# Patient Record
Sex: MALE | Race: WHITE | NOT HISPANIC OR LATINO | Employment: OTHER | URBAN - METROPOLITAN AREA
[De-identification: names, ages, dates, MRNs, and addresses within clinical notes are randomized per-mention and may not be internally consistent; named-entity substitution may affect disease eponyms.]

---

## 2023-09-26 DIAGNOSIS — M25.512 LEFT SHOULDER PAIN, UNSPECIFIED CHRONICITY: Primary | ICD-10-CM

## 2023-09-27 ENCOUNTER — OFFICE VISIT (OUTPATIENT)
Dept: ORTHOPEDICS | Facility: CLINIC | Age: 68
End: 2023-09-27
Payer: MEDICARE

## 2023-09-27 ENCOUNTER — HOSPITAL ENCOUNTER (OUTPATIENT)
Dept: RADIOLOGY | Facility: HOSPITAL | Age: 68
Discharge: HOME | End: 2023-09-27
Attending: ORTHOPAEDIC SURGERY
Payer: MEDICARE

## 2023-09-27 VITALS — BODY MASS INDEX: 26.36 KG/M2 | WEIGHT: 178 LBS | HEIGHT: 69 IN

## 2023-09-27 DIAGNOSIS — M25.512 LEFT SHOULDER PAIN, UNSPECIFIED CHRONICITY: ICD-10-CM

## 2023-09-27 DIAGNOSIS — M25.512 LEFT SHOULDER PAIN: Primary | ICD-10-CM

## 2023-09-27 PROCEDURE — 73030 X-RAY EXAM OF SHOULDER: CPT | Mod: TC,PO,LT

## 2023-09-27 PROCEDURE — 99212 OFFICE O/P EST SF 10 MIN: CPT | Mod: PBBFAC,PO | Performed by: ORTHOPAEDIC SURGERY

## 2023-09-27 PROCEDURE — 73030 X-RAY EXAM OF SHOULDER: CPT | Mod: 26,LT,, | Performed by: RADIOLOGY

## 2023-09-27 PROCEDURE — 99204 PR OFFICE/OUTPT VISIT, NEW, LEVL IV, 45-59 MIN: ICD-10-PCS | Mod: S$PBB,,, | Performed by: ORTHOPAEDIC SURGERY

## 2023-09-27 PROCEDURE — 99204 OFFICE O/P NEW MOD 45 MIN: CPT | Mod: S$PBB,,, | Performed by: ORTHOPAEDIC SURGERY

## 2023-09-27 PROCEDURE — 99999 PR PBB SHADOW E&M-EST. PATIENT-LVL II: CPT | Mod: PBBFAC,,, | Performed by: ORTHOPAEDIC SURGERY

## 2023-09-27 PROCEDURE — 73030 XR SHOULDER TRAUMA 3 VIEW LEFT: ICD-10-PCS | Mod: 26,LT,, | Performed by: RADIOLOGY

## 2023-09-27 PROCEDURE — 99999 PR PBB SHADOW E&M-EST. PATIENT-LVL II: ICD-10-PCS | Mod: PBBFAC,,, | Performed by: ORTHOPAEDIC SURGERY

## 2023-09-27 NOTE — PROGRESS NOTES
67 years old left shoulder pain for the past several months.  Has a history of the rotator cuff repair done in 2008 [place] he is moved down from [place] been here for the past several months wants to establish care.  Pain with certain movements such as abduction or reaching behind his back.  Pain is 5 on good days 7 on bad days.  Oral anti-inflammatories and injections have provided partial relief.    Exam shows full motion of the shoulder weakly positive Neer Benitez impingement sign, cuff strength weak and painful     X-rays are negative, MRI from outlying facility shows chronic retracted full-thickness rotator cuff tear     Assessment: Chronic left shoulder rotator cuff tear     Plan:  Patient was told he would need a reverse shoulder arthroplasty, at this point he is quite functional I do not feel that would be the best option for him.  We will get him set up with a home exercise program, we did offer him therapy, and we did discuss with him the possibility of subacromial balloon spacer

## 2023-12-18 ENCOUNTER — OFFICE VISIT (OUTPATIENT)
Dept: ORTHOPEDICS | Facility: CLINIC | Age: 68
End: 2023-12-18
Payer: MEDICARE

## 2023-12-18 VITALS — BODY MASS INDEX: 26.36 KG/M2 | WEIGHT: 178 LBS | HEIGHT: 69 IN

## 2023-12-18 DIAGNOSIS — M25.512 ACUTE PAIN OF LEFT SHOULDER: Primary | ICD-10-CM

## 2023-12-18 PROCEDURE — 99999 PR PBB SHADOW E&M-EST. PATIENT-LVL II: ICD-10-PCS | Mod: PBBFAC,,, | Performed by: ORTHOPAEDIC SURGERY

## 2023-12-18 PROCEDURE — 99215 PR OFFICE/OUTPT VISIT, EST, LEVL V, 40-54 MIN: ICD-10-PCS | Mod: 57,S$PBB,, | Performed by: ORTHOPAEDIC SURGERY

## 2023-12-18 PROCEDURE — 99212 OFFICE O/P EST SF 10 MIN: CPT | Mod: PBBFAC,PO | Performed by: ORTHOPAEDIC SURGERY

## 2023-12-18 PROCEDURE — 99999 PR PBB SHADOW E&M-EST. PATIENT-LVL II: CPT | Mod: PBBFAC,,, | Performed by: ORTHOPAEDIC SURGERY

## 2023-12-18 PROCEDURE — 99215 OFFICE O/P EST HI 40 MIN: CPT | Mod: 57,S$PBB,, | Performed by: ORTHOPAEDIC SURGERY

## 2023-12-18 NOTE — PROGRESS NOTES
68 years old persistent pain in the left shoulder. He is attempted home exercise program without complete relief still having pain in his shoulder with overhead activity reaching behind his back     History significant for previous rotator cuff surgery    Exam shows full motion of the shoulder positive Neer Benitez impingement sign, cuff strength weak and painful     X-ray and MRI from outlying facility show chronic full-thickness retracted tear of the rotator cuff     Assessment:  Chronic left shoulder rotator cuff tear,  recurrent rotator cuff tear     Plan: We will schedule the patient for shoulder arthroscopy attempt rotator cuff repair and if unable to repair we will place a subacromial balloon spacer

## 2023-12-21 DIAGNOSIS — M25.512 LEFT SHOULDER PAIN: Primary | ICD-10-CM

## 2023-12-21 DIAGNOSIS — M75.102 ROTATOR CUFF TEAR, LEFT: ICD-10-CM

## 2023-12-21 RX ORDER — CEFAZOLIN SODIUM 2 G/50ML
2 SOLUTION INTRAVENOUS
Status: CANCELLED | OUTPATIENT
Start: 2023-12-21

## 2023-12-21 RX ORDER — MUPIROCIN 20 MG/G
OINTMENT TOPICAL
Status: CANCELLED | OUTPATIENT
Start: 2023-12-21

## 2024-01-18 ENCOUNTER — TELEPHONE (OUTPATIENT)
Dept: ORTHOPEDICS | Facility: CLINIC | Age: 69
End: 2024-01-18
Payer: MEDICARE

## 2024-01-18 NOTE — TELEPHONE ENCOUNTER
Contacted Kezia. Pre-op instruction [facility] to the [facility] and was reviewed with Kezia. Paperwork was missed placed. Patient verbalized understanding.

## 2024-01-18 NOTE — TELEPHONE ENCOUNTER
----- Message from Vincenzo Matamoros MA sent at 1/18/2024  1:39 PM CST -----  Contact: wife/Kezia  Has questions regarding Pre Op appts?    Call back number is [phone]

## 2024-01-22 ENCOUNTER — TELEPHONE (OUTPATIENT)
Dept: ORTHOPEDICS | Facility: CLINIC | Age: 69
End: 2024-01-22
Payer: MEDICARE

## 2024-01-22 NOTE — TELEPHONE ENCOUNTER
----- Message from Vincenzo Matamoros MA sent at 1/22/2024  1:36 PM CST -----  Contact: wife/Kezia  Wants to know if patient needs any surgical clearances done prior to his surgery on 1/30/24?    Call back number is [phone]

## 2024-01-22 NOTE — TELEPHONE ENCOUNTER
Spoke with patient and patient's wife. Explained no pre-operative clearance or labwork necessary for this type of procedure.

## 2024-01-25 RX ORDER — VITAMIN B COMPLEX
1 CAPSULE ORAL DAILY
COMMUNITY

## 2024-01-29 ENCOUNTER — ANESTHESIA EVENT (OUTPATIENT)
Dept: SURGERY | Facility: HOSPITAL | Age: 69
End: 2024-01-29
Payer: MEDICARE

## 2024-01-29 DIAGNOSIS — Z98.890 S/P ARTHROSCOPY OF LEFT SHOULDER: Primary | ICD-10-CM

## 2024-01-29 RX ORDER — OXYCODONE AND ACETAMINOPHEN 7.5; 325 MG/1; MG/1
1 TABLET ORAL
Qty: 32 TABLET | Refills: 0 | Status: SHIPPED | OUTPATIENT
Start: 2024-01-29 | End: 2024-05-31

## 2024-01-30 ENCOUNTER — ANESTHESIA (OUTPATIENT)
Dept: SURGERY | Facility: HOSPITAL | Age: 69
End: 2024-01-30
Payer: MEDICARE

## 2024-01-30 ENCOUNTER — HOSPITAL ENCOUNTER (OUTPATIENT)
Facility: HOSPITAL | Age: 69
Discharge: HOME | End: 2024-01-30
Attending: ORTHOPAEDIC SURGERY | Admitting: ORTHOPAEDIC SURGERY
Payer: MEDICARE

## 2024-01-30 DIAGNOSIS — M25.512 LEFT SHOULDER PAIN: ICD-10-CM

## 2024-01-30 DIAGNOSIS — M75.102 ROTATOR CUFF TEAR, LEFT: ICD-10-CM

## 2024-01-30 PROCEDURE — 36000710: Mod: PO | Performed by: ORTHOPAEDIC SURGERY

## 2024-01-30 PROCEDURE — 37000008 HC ANESTHESIA 1ST 15 MINUTES: Mod: PO | Performed by: ORTHOPAEDIC SURGERY

## 2024-01-30 PROCEDURE — 63600175 PHARM REV CODE 636 W HCPCS: Mod: PO | Performed by: NURSE ANESTHETIST, CERTIFIED REGISTERED

## 2024-01-30 PROCEDURE — 71000033 HC RECOVERY, INTIAL HOUR: Mod: PO | Performed by: ORTHOPAEDIC SURGERY

## 2024-01-30 PROCEDURE — 25000003 PHARM REV CODE 250: Mod: PO | Performed by: ORTHOPAEDIC SURGERY

## 2024-01-30 PROCEDURE — 63600175 PHARM REV CODE 636 W HCPCS: Mod: PO | Performed by: ANESTHESIOLOGY

## 2024-01-30 PROCEDURE — 37000009 HC ANESTHESIA EA ADD 15 MINS: Mod: PO | Performed by: ORTHOPAEDIC SURGERY

## 2024-01-30 PROCEDURE — D9220A PRA ANESTHESIA: Mod: CRNA,,, | Performed by: NURSE ANESTHETIST, CERTIFIED REGISTERED

## 2024-01-30 PROCEDURE — 27201423 OPTIME MED/SURG SUP & DEVICES STERILE SUPPLY: Mod: PO | Performed by: ORTHOPAEDIC SURGERY

## 2024-01-30 PROCEDURE — 71000015 HC POSTOP RECOV 1ST HR: Mod: PO | Performed by: ORTHOPAEDIC SURGERY

## 2024-01-30 PROCEDURE — 36000711: Mod: PO | Performed by: ORTHOPAEDIC SURGERY

## 2024-01-30 PROCEDURE — C1727 CATH, BAL TIS DIS, NON-VAS: HCPCS | Mod: PO | Performed by: ORTHOPAEDIC SURGERY

## 2024-01-30 PROCEDURE — 25000003 PHARM REV CODE 250: Mod: PO | Performed by: NURSE ANESTHETIST, CERTIFIED REGISTERED

## 2024-01-30 PROCEDURE — 29999 UNLISTED PX ARTHROSCOPY: CPT | Mod: ,,, | Performed by: ORTHOPAEDIC SURGERY

## 2024-01-30 PROCEDURE — D9220A PRA ANESTHESIA: Mod: ANES,,, | Performed by: ANESTHESIOLOGY

## 2024-01-30 DEVICE — IMPLANTABLE DEVICE: Type: IMPLANTABLE DEVICE | Site: SHOULDER | Status: FUNCTIONAL

## 2024-01-30 RX ORDER — ROCURONIUM BROMIDE 10 MG/ML
INJECTION, SOLUTION INTRAVENOUS
Status: DISCONTINUED | OUTPATIENT
Start: 2024-01-30 | End: 2024-01-30

## 2024-01-30 RX ORDER — ONDANSETRON HYDROCHLORIDE 2 MG/ML
INJECTION, SOLUTION INTRAVENOUS
Status: DISCONTINUED | OUTPATIENT
Start: 2024-01-30 | End: 2024-01-30

## 2024-01-30 RX ORDER — ACETAMINOPHEN 10 MG/ML
INJECTION, SOLUTION INTRAVENOUS
Status: DISCONTINUED | OUTPATIENT
Start: 2024-01-30 | End: 2024-01-30

## 2024-01-30 RX ORDER — LIDOCAINE HYDROCHLORIDE 10 MG/ML
1 INJECTION, SOLUTION EPIDURAL; INFILTRATION; INTRACAUDAL; PERINEURAL ONCE
Status: DISCONTINUED | OUTPATIENT
Start: 2024-01-30 | End: 2024-01-30 | Stop reason: HOSPADM

## 2024-01-30 RX ORDER — MIDAZOLAM HYDROCHLORIDE 1 MG/ML
0.5 INJECTION INTRAMUSCULAR; INTRAVENOUS
Status: DISCONTINUED | OUTPATIENT
Start: 2024-01-30 | End: 2024-01-30 | Stop reason: HOSPADM

## 2024-01-30 RX ORDER — FENTANYL CITRATE 50 UG/ML
25 INJECTION, SOLUTION INTRAMUSCULAR; INTRAVENOUS EVERY 5 MIN PRN
Status: DISCONTINUED | OUTPATIENT
Start: 2024-01-30 | End: 2024-01-30 | Stop reason: HOSPADM

## 2024-01-30 RX ORDER — CEFAZOLIN SODIUM 1 G/3ML
INJECTION, POWDER, FOR SOLUTION INTRAMUSCULAR; INTRAVENOUS
Status: DISCONTINUED | OUTPATIENT
Start: 2024-01-30 | End: 2024-01-30

## 2024-01-30 RX ORDER — CEFAZOLIN SODIUM 2 G/50ML
2 SOLUTION INTRAVENOUS
Status: DISCONTINUED | OUTPATIENT
Start: 2024-01-30 | End: 2024-01-30 | Stop reason: HOSPADM

## 2024-01-30 RX ORDER — MIDAZOLAM HYDROCHLORIDE 1 MG/ML
INJECTION, SOLUTION INTRAMUSCULAR; INTRAVENOUS
Status: DISCONTINUED | OUTPATIENT
Start: 2024-01-30 | End: 2024-01-30

## 2024-01-30 RX ORDER — EPHEDRINE SULFATE 50 MG/ML
INJECTION, SOLUTION INTRAVENOUS
Status: DISCONTINUED | OUTPATIENT
Start: 2024-01-30 | End: 2024-01-30

## 2024-01-30 RX ORDER — LIDOCAINE HYDROCHLORIDE 20 MG/ML
INJECTION INTRAVENOUS
Status: DISCONTINUED | OUTPATIENT
Start: 2024-01-30 | End: 2024-01-30

## 2024-01-30 RX ORDER — SODIUM CHLORIDE, SODIUM LACTATE, POTASSIUM CHLORIDE, CALCIUM CHLORIDE 600; 310; 30; 20 MG/100ML; MG/100ML; MG/100ML; MG/100ML
INJECTION, SOLUTION INTRAVENOUS CONTINUOUS
Status: DISCONTINUED | OUTPATIENT
Start: 2024-01-30 | End: 2024-01-30 | Stop reason: HOSPADM

## 2024-01-30 RX ORDER — DEXAMETHASONE SODIUM PHOSPHATE 4 MG/ML
8 INJECTION, SOLUTION INTRA-ARTICULAR; INTRALESIONAL; INTRAMUSCULAR; INTRAVENOUS; SOFT TISSUE
Status: COMPLETED | OUTPATIENT
Start: 2024-01-30 | End: 2024-01-30

## 2024-01-30 RX ORDER — HYDROMORPHONE HYDROCHLORIDE 2 MG/ML
0.2 INJECTION, SOLUTION INTRAMUSCULAR; INTRAVENOUS; SUBCUTANEOUS EVERY 5 MIN PRN
Status: DISCONTINUED | OUTPATIENT
Start: 2024-01-30 | End: 2024-01-30 | Stop reason: HOSPADM

## 2024-01-30 RX ORDER — PHENYLEPHRINE HYDROCHLORIDE 10 MG/ML
INJECTION INTRAVENOUS
Status: DISCONTINUED | OUTPATIENT
Start: 2024-01-30 | End: 2024-01-30

## 2024-01-30 RX ORDER — MUPIROCIN 20 MG/G
OINTMENT TOPICAL
Status: DISCONTINUED | OUTPATIENT
Start: 2024-01-30 | End: 2024-01-30 | Stop reason: HOSPADM

## 2024-01-30 RX ORDER — PROPOFOL 10 MG/ML
VIAL (ML) INTRAVENOUS
Status: DISCONTINUED | OUTPATIENT
Start: 2024-01-30 | End: 2024-01-30

## 2024-01-30 RX ORDER — OXYCODONE HYDROCHLORIDE 5 MG/1
5 TABLET ORAL
Status: DISCONTINUED | OUTPATIENT
Start: 2024-01-30 | End: 2024-01-30 | Stop reason: HOSPADM

## 2024-01-30 RX ORDER — FENTANYL CITRATE 50 UG/ML
INJECTION, SOLUTION INTRAMUSCULAR; INTRAVENOUS
Status: DISCONTINUED | OUTPATIENT
Start: 2024-01-30 | End: 2024-01-30

## 2024-01-30 RX ADMIN — EPHEDRINE SULFATE 10 MG: 50 INJECTION INTRAVENOUS at 01:01

## 2024-01-30 RX ADMIN — MUPIROCIN: 20 OINTMENT TOPICAL at 11:01

## 2024-01-30 RX ADMIN — SUGAMMADEX 200 MG: 100 INJECTION, SOLUTION INTRAVENOUS at 01:01

## 2024-01-30 RX ADMIN — SODIUM CHLORIDE, POTASSIUM CHLORIDE, SODIUM LACTATE AND CALCIUM CHLORIDE: 600; 310; 30; 20 INJECTION, SOLUTION INTRAVENOUS at 11:01

## 2024-01-30 RX ADMIN — PROPOFOL 200 MG: 10 INJECTION, EMULSION INTRAVENOUS at 01:01

## 2024-01-30 RX ADMIN — FENTANYL CITRATE 50 MCG: 0.05 INJECTION, SOLUTION INTRAMUSCULAR; INTRAVENOUS at 01:01

## 2024-01-30 RX ADMIN — CEFAZOLIN 2 G: 330 INJECTION, POWDER, FOR SOLUTION INTRAMUSCULAR; INTRAVENOUS at 01:01

## 2024-01-30 RX ADMIN — FENTANYL CITRATE 50 MCG: 50 INJECTION INTRAMUSCULAR; INTRAVENOUS at 11:01

## 2024-01-30 RX ADMIN — PHENYLEPHRINE HYDROCHLORIDE 100 MCG: 10 INJECTION INTRAVENOUS at 01:01

## 2024-01-30 RX ADMIN — DEXAMETHASONE SODIUM PHOSPHATE 8 MG: 4 INJECTION INTRA-ARTICULAR; INTRALESIONAL; INTRAMUSCULAR; INTRAVENOUS; SOFT TISSUE at 11:01

## 2024-01-30 RX ADMIN — LIDOCAINE HYDROCHLORIDE 100 MG: 20 INJECTION INTRAVENOUS at 01:01

## 2024-01-30 RX ADMIN — MIDAZOLAM HYDROCHLORIDE 2 MG: 1 INJECTION, SOLUTION INTRAMUSCULAR; INTRAVENOUS at 01:01

## 2024-01-30 RX ADMIN — ONDANSETRON 4 MG: 2 INJECTION, SOLUTION INTRAMUSCULAR; INTRAVENOUS at 01:01

## 2024-01-30 RX ADMIN — ROCURONIUM BROMIDE 30 MG: 10 INJECTION, SOLUTION INTRAVENOUS at 01:01

## 2024-01-30 RX ADMIN — ACETAMINOPHEN 1000 MG: 10 INJECTION, SOLUTION INTRAVENOUS at 01:01

## 2024-01-30 RX ADMIN — MIDAZOLAM 1 MG: 1 INJECTION INTRAMUSCULAR; INTRAVENOUS at 11:01

## 2024-01-30 NOTE — ANESTHESIA POSTPROCEDURE EVALUATION
Anesthesia Post Evaluation    Patient: Zach Guillen Jr.    Procedure(s) Performed: Procedure(s) (LRB):  ARTHROSCOPY, SHOULDER WITH DECOMPRESSION,PLACEMENTOF BALLOON SPACER (Left)    Final Anesthesia Type: general      Patient location during evaluation: PACU  Patient participation: Yes- Able to Participate  Level of consciousness: sedated and awake  Post-procedure vital signs: reviewed and stable  Pain management: adequate  Airway patency: patent    PONV status at discharge: No PONV  Anesthetic complications: no      Cardiovascular status: blood pressure returned to baseline  Respiratory status: spontaneous ventilation  Hydration status: euvolemic  Follow-up not needed.              Vitals Value Taken Time   /71 01/30/24 1413   Temp  01/30/24 1416   Pulse 72 01/30/24 1413   Resp 16 01/30/24 1413   SpO2 99 % 01/30/24 1413         No case tracking events are documented in the log.      Pain/Tiny Score: Pain Rating Prior to Med Admin: 0 (1/30/2024 12:25 PM)  Pain Rating Post Med Admin: 0 (1/30/2024 12:25 PM)  Tiny Score: 9 (1/30/2024  1:58 PM)

## 2024-01-30 NOTE — ANESTHESIA PROCEDURE NOTES
Intubation    Date/Time: 1/30/2024 1:10 PM    Performed by: Faye Mittal CRNA  Authorized by: Olman Rodrigues MD    Intubation:     Induction:  Intravenous    Intubated:  Postinduction    Mask Ventilation:  Easy mask    Attempts:  1    Attempted By:  CRNA    Method of Intubation:  Video laryngoscopy    Blade:  Fernandez 3    Laryngeal View Grade: Grade I - full view of cords      Difficult Airway Encountered?: No      Complications:  None    Airway Device:  Oral endotracheal tube    Airway Device Size:  8.0    Style/Cuff Inflation:  Cuffed (inflated to minimal occlusive pressure)    Tube secured:  23    Secured at:  The lips    Placement Verified By:  Capnometry    Complicating Factors:  None    Findings Post-Intubation:  BS equal bilateral and atraumatic/condition of teeth unchanged

## 2024-01-30 NOTE — DISCHARGE SUMMARY
Discharge Note  Short Stay      SUMMARY     Admit Date: 1/30/2024    Attending Physician: Jarred Luna MD     Discharge Physician: Jarred Luna MD    Discharge Date: 1/30/2024 1:49 PM    Final Diagnosis: Left shoulder pain [M25.512]  Rotator cuff tear, left [M75.102]    Hospital Course: Patient tolerated procedure well.     Disposition: Home or Self Care    Patient Instructions:   Current Discharge Medication List        CONTINUE these medications which have NOT CHANGED    Details   b complex vitamins capsule Take 1 capsule by mouth once daily.      ondansetron (ZOFRAN-ODT) 4 MG TbDL Take 1 tablet (4 mg total) by mouth every 8 (eight) hours as needed (nausea).  Qty: 12 tablet, Refills: 0      oxyCODONE-acetaminophen (PERCOCET) 7.5-325 mg per tablet Take 1 tablet by mouth every 4 to 6 hours as needed for Pain.  Qty: 32 tablet, Refills: 0    Comments: Quantity prescribed more than 7 day supply? Yes, quantity medically necessary  Associated Diagnoses: S/P arthroscopy of left shoulder             Discharge Procedure Orders (must include Diet, Follow-up, Activity):   Discharge Procedure Orders (must include Diet, Follow-up, Activity)   Remove dressing in 48 hours     Ice to affected area     Lifting restrictions   Order Comments: Use sling for comfort     SLING FOR HOME USE     Activity as tolerated        Follow Up:  Follow up as scheduled.  Resume routine diet.  Activity as tolerated.    No driving day of procedure.    Rolling Walker:  Patient has a mobility limitation that can not be sufficiently resolved by the use of a cane.  Patient's functional mobility deficit can be sufficiently resolved with the use of a rolling walker or walker.  Patient has mobility limitations significantly impairs her ability to participate in 1 or more activities of daily living (toileting, feeding, dressing, grooming, and bathing in customary locations in the home) The use of a rolling walker or walker we will significantly  improve the patient's ability to participate in mobility related activities of daily living and the patient will use it on a regular basis in the home

## 2024-01-30 NOTE — DISCHARGE INSTRUCTIONS
Shoulder Scope/Rotator Cuff    After surgery:    DO:   Minimal activity for the first 48 hours.  Advance diet as tolerated.    Keep operative site clean and dry for 3 days.   Remove bandages in 3 days. Then you may shower. Pat incisions dry, and place band-aids over surgical site. Reapply sling.   Wear sling at all times until block wears off.   Apply ice to shoulder for 20-30 minute intervals for next 3 days. Only apply ice while you are awake.    Move fingers and check circulation by pressing on nails. Color should be white to pink.   Resume all home medications.    DO NOT:   Do not soak in tub or pool until cleared by your physician.   Do not drive for 24 hours or while taking narcotic pain medication.  Do not take additional tylenol/acetaminophen while taking narcotic pain medication that contains tylenol/acetaminophen.     CALL DOCTOR FOR:  Signs of infection, such as redness, increased swelling, or drainage around the operative site.  Fever greater than 101.     Call your physician at [phone] for questions.

## 2024-01-30 NOTE — OP NOTE
[facility] - Surgery  Operative Note    SUMMARY     Date of Procedure: 1/30/2024     Pre-Operative Diagnosis: Left shoulder pain [M25.512]  Rotator cuff tear, left [M75.102]    Post-Operative Diagnosis: Post-Op Diagnosis Codes:     * Left shoulder pain [M25.512]     * Rotator cuff tear, left [M75.102]    Procedure: * No procedures listed *       Surgeon(s) and Role:     * Jarred Luna MD - Primary    Indications for procedure:      Procedure in Detail:  After obtaining consent and starting the patient on preoperative IV antibiotics, patient was taken back to the operating room where general anesthesia was performed the anesthesia team.  Patient positioned in the beach chair position stabilized with a captain's chair.  The left shoulder and upper extremity were prepped and draped in normal sterile fashion.  Standard posterior portal was established arthroscope introduced into the shoulder joint articular surface showed some areas of degeneration labrum was intact biceps was intact but appeared to be subluxed.  No loose bodies axillary recess.  There was absence of rotator cuff superiorly evidence of previous acromioplasty.  We then established a lateral portal performed decompression near taking down some of the bursa got good visualization at this point we attempted to repair pretty much non-existent tissue which was a successful unable to perform rotator cuff repair we then proceeded with our subacromial balloon spacer Karen size medium balloon spacer was placed into the subacromial space over the yd tubular surface with the humeral head we would insufflated with saline solution 24 cc we then back down 8 cc.  This point range of the shoulder space remain in place were satisfied with the procedure we removed arthroscopic instruments closed portal sites with nylon stitches sterile dressing applied, patient placed into a sling    Anesthesia: * No anesthesia type entered *    Complications: No    Estimated Blood  Loss (EBL): 25 mL           Implants:   Implant Name Type Inv. Item Serial No.  Lot No. LRB No. Used Action   SPAC[employer] - [id number]  [employer]  [employer] [id number] 1 Implanted       Specimens:   Specimen (24h ago, onward)      None

## 2024-01-30 NOTE — PLAN OF CARE
Interscalene single shot block complete per Dr. Rodrigues with Anesthesia. Exparel band placed to pt's wrist. Pt tolerated well. See Anesthesia record for procedural notes. See flowsheet and MAR for vitals and medications. Monitors in place, alarms audible. VSS. etCO2 monitoring in place. Resp even, unlabored. Skin warm, dry. Pt in NAD. Pt awaiting transport to OR. Family at bedside. Bed in lowest, locked position. Side rails up x2. Call light within reach.

## 2024-01-30 NOTE — H&P
68 years old persistent pain in the left shoulder. He is attempted home exercise program without complete relief still having pain in his shoulder with overhead activity reaching behind his back      History significant for previous rotator cuff surgery     Exam shows full motion of the shoulder positive Neer Benitez impingement sign, cuff strength weak and painful      X-ray and MRI from outlying facility show chronic full-thickness retracted tear of the rotator cuff      Assessment:  Chronic left shoulder rotator cuff tear,  recurrent rotator cuff tear      Plan: We will schedule the patient for shoulder arthroscopy attempt rotator cuff repair and if unable to repair we will place a subacromial balloon spacer       Imaging studies ordered and reviewed by me    Further History  Aching pain  Worse with activity  Relieved with rest  No other associated symptoms  No other radiation    Further Exam  Alert and oriented  Pleasant  Contralateral limb has appropriate range of motion for age and condition  Contralateral limb has appropriate strength for age and condition  Contralateral limb has appropriate stability  for age and condition  No adenopathy  Pulses are appropriate for current condition  Skin is intact        Chief Complaint    No chief complaint on file.      HPI  Zach Guillen Jr. is a 68 y.o.  male who presents with       Past Medical History  History reviewed. No pertinent past medical history.    Past Surgical History  Past Surgical History:   Procedure Laterality Date    KNEE ARTHROSCOPY Left     ROTATOR CUFF REPAIR      left 2008; right 2012    sinus surgery         Medications  Current Facility-Administered Medications   Medication    cefazolin (ANCEF) 2 gram in dextrose 5% 50 mL IVPB (premix)    fentaNYL 50 mcg/mL injection 25 mcg    lactated ringers infusion    LIDOcaine (PF) 10 mg/ml (1%) injection 10 mg    midazolam (VERSED) 1 mg/mL injection 0.5 mg    mupirocin 2 % ointment       Allergies  Review  of patient's allergies indicates:  No Known Allergies    Family History  History reviewed. No pertinent family history.    Social History  Social History     Socioeconomic History    Marital status:    Tobacco Use    Smoking status: Never    Smokeless tobacco: Never   Substance and Sexual Activity    Alcohol use: Not Currently    Drug use: Never     Social Determinants of Health     Financial Resource Strain: Low Risk  (12/15/2023)    Overall Financial Resource Strain (CARDIA)     Difficulty of Paying Living Expenses: Not hard at all   Food Insecurity: No Food Insecurity (12/15/2023)    Hunger Vital Sign     Worried About Running Out of Food in the Last Year: Never true     Ran Out of Food in the Last Year: Never true   Transportation Needs: No Transportation Needs (12/15/2023)    PRAPARE - Transportation     Lack of Transportation (Medical): No     Lack of Transportation (Non-Medical): No   Physical Activity: Sufficiently Active (12/15/2023)    Exercise Vital Sign     Days of Exercise per Week: 4 days     Minutes of Exercise per Session: 90 min   Stress: No Stress Concern Present (12/15/2023)    [place] [employer] - Occupational Stress Questionnaire     Feeling of Stress : Not at all   Social Connections: Unknown (12/15/2023)    Social Connection and Isolation Panel [NHANES]     Frequency of Communication with Friends and Family: Twice a week     Frequency of Social Gatherings with Friends and Family: More than three times a week     Active Member of Clubs or Organizations: Yes     Attends Club or Organization Meetings: 1 to 4 times per year     Marital Status:    Housing Stability: Unknown (12/15/2023)    Housing Stability Vital Sign     Unable to Pay for Housing in the Last Year: No     Unstable Housing in the Last Year: Patient declined               Review of Systems     Constitutional: Negative    HENT: Negative  Eyes: Negative  Respiratory: Negative  Cardiovascular:  Negative  Musculoskeletal: HPI  Skin: Negative  Neurological: Negative  Hematological: Negative  Endocrine: Negative                 Physical Exam    Vitals:    01/30/24 1225   BP: 128/78   Pulse: 61   Resp: 15   Temp:      Body mass index is 25.84 kg/m².  Physical Examination:     General appearance -  well appearing, and in no distress  Mental status - awake  Neck - supple  Chest -  symmetric air entry  Heart - normal rate   Abdomen - soft      Assessment     1. Left shoulder pain    2. Rotator cuff tear, left          Plan

## 2024-01-30 NOTE — TRANSFER OF CARE
"Anesthesia Transfer of Care Note    Patient: Zach Guillen Jr.    Procedure(s) Performed: Procedure(s) (LRB):  ARTHROSCOPY, SHOULDER WITH DECOMPRESSION,PLACEMENTOF BALLOON SPACER (Left)    Patient location: PACU    Anesthesia Type: general and regional    Transport from OR: Transported from OR on 2-3 L/min O2 by NC with adequate spontaneous ventilation    Post pain: adequate analgesia    Post assessment: no apparent anesthetic complications and tolerated procedure well    Post vital signs: stable    Level of consciousness: awake    Nausea/Vomiting: no nausea/vomiting    Complications: none    Transfer of care protocol was followed      Last vitals: Visit Vitals  /81   Pulse 64   Temp 36.4 °C (97.6 °F)   Resp 15   Ht 5' 9" (1.753 m)   Wt 79.4 kg (175 lb)   SpO2 100%   BMI 25.84 kg/m²     "

## 2024-01-30 NOTE — ANESTHESIA PREPROCEDURE EVALUATION
01/30/2024  Zach Guillen Jr. is a 68 y.o., male.      Pre-op Assessment    I have reviewed the Patient Summary Reports.     I have reviewed the Nursing Notes. I have reviewed the NPO Status.   I have reviewed the Medications.     Review of Systems  Cardiovascular:  Cardiovascular Normal                  ECG has been reviewed.                          Pulmonary:  Pulmonary Normal                       Renal/:  Renal/ Normal                 Hepatic/GI:  Hepatic/GI Normal                 Musculoskeletal:     Left shoulder            Neurological:  Neurology Normal                                      Endocrine:  Endocrine Normal                Physical Exam  General: Well nourished    Airway:  Mallampati: II   Neck ROM: Normal ROM    Dental:  Intact    Chest/Lungs:  Clear to auscultation, Normal Respiratory Rate    Heart:  Rate: Normal  Rhythm: Regular Rhythm        Anesthesia Plan  Type of Anesthesia, risks & benefits discussed:    Anesthesia Type: Gen ETT  Intra-op Monitoring Plan: Standard ASA Monitors  Post Op Pain Control Plan: multimodal analgesia, IV/PO Opioids PRN and peripheral nerve block  Induction:  IV and Inhalation  Informed Consent: Informed consent signed with the Patient and all parties understand the risks and agree with anesthesia plan.  All questions answered.   ASA Score: 1    Ready For Surgery From Anesthesia Perspective.     .

## 2024-01-31 VITALS
WEIGHT: 175 LBS | OXYGEN SATURATION: 99 % | TEMPERATURE: 98 F | BODY MASS INDEX: 25.92 KG/M2 | SYSTOLIC BLOOD PRESSURE: 121 MMHG | HEIGHT: 69 IN | HEART RATE: 72 BPM | DIASTOLIC BLOOD PRESSURE: 71 MMHG | RESPIRATION RATE: 16 BRPM

## 2024-02-14 ENCOUNTER — PATIENT MESSAGE (OUTPATIENT)
Dept: ORTHOPEDICS | Facility: CLINIC | Age: 69
End: 2024-02-14
Payer: MEDICARE

## 2024-02-19 ENCOUNTER — OFFICE VISIT (OUTPATIENT)
Dept: ORTHOPEDICS | Facility: CLINIC | Age: 69
End: 2024-02-19
Payer: MEDICARE

## 2024-02-19 VITALS — BODY MASS INDEX: 25.92 KG/M2 | WEIGHT: 175 LBS | HEIGHT: 69 IN

## 2024-02-19 DIAGNOSIS — M25.512 LEFT SHOULDER PAIN, UNSPECIFIED CHRONICITY: Primary | ICD-10-CM

## 2024-02-19 PROCEDURE — 99999 PR PBB SHADOW E&M-EST. PATIENT-LVL II: CPT | Mod: PBBFAC,,, | Performed by: ORTHOPAEDIC SURGERY

## 2024-02-19 PROCEDURE — 99212 OFFICE O/P EST SF 10 MIN: CPT | Mod: PBBFAC,PO | Performed by: ORTHOPAEDIC SURGERY

## 2024-02-19 PROCEDURE — 99024 POSTOP FOLLOW-UP VISIT: CPT | Mod: POP,,, | Performed by: ORTHOPAEDIC SURGERY

## 2024-02-19 NOTE — PROGRESS NOTES
68 years old few weeks out from subacromial balloon spacer for a repairable rotator cuff tear. reports no pain today     Exam shows he is able put his arm up over his head without any pain     Plan:  Physical therapy for range of motion strengthening exercises, follow-up in 2 months time

## 2024-03-13 ENCOUNTER — OFFICE VISIT (OUTPATIENT)
Dept: ORTHOPEDICS | Facility: CLINIC | Age: 69
End: 2024-03-13
Payer: MEDICARE

## 2024-03-13 VITALS — WEIGHT: 175 LBS | BODY MASS INDEX: 25.92 KG/M2 | HEIGHT: 69 IN

## 2024-03-13 DIAGNOSIS — Z98.890 S/P ARTHROSCOPY OF LEFT SHOULDER: Primary | ICD-10-CM

## 2024-03-13 PROCEDURE — 99024 POSTOP FOLLOW-UP VISIT: CPT | Mod: POP,,, | Performed by: ORTHOPAEDIC SURGERY

## 2024-03-13 PROCEDURE — 99999 PR PBB SHADOW E&M-EST. PATIENT-LVL II: CPT | Mod: PBBFAC,,, | Performed by: ORTHOPAEDIC SURGERY

## 2024-03-13 PROCEDURE — 99212 OFFICE O/P EST SF 10 MIN: CPT | Mod: PBBFAC,PO | Performed by: ORTHOPAEDIC SURGERY

## 2024-03-13 NOTE — PROGRESS NOTES
Three months out from arthroscopic subacromial balloon spacer doing well.  Patient is highly functional only has pain with extended overhead use  Patient states that he has no better or no worse than he was prior to surgery    Exam shows no signs infection good motion and strength    Plan:  Encourage rotator cuff strengthening and balancing exercises, follow-up as needed

## 2024-03-26 ENCOUNTER — CLINICAL SUPPORT (OUTPATIENT)
Dept: REHABILITATION | Facility: HOSPITAL | Age: 69
End: 2024-03-26
Attending: ORTHOPAEDIC SURGERY
Payer: MEDICARE

## 2024-03-26 DIAGNOSIS — M25.512 LEFT SHOULDER PAIN, UNSPECIFIED CHRONICITY: ICD-10-CM

## 2024-03-26 DIAGNOSIS — R29.898 UPPER EXTREMITY WEAKNESS: Primary | ICD-10-CM

## 2024-03-26 PROCEDURE — 97161 PT EVAL LOW COMPLEX 20 MIN: CPT | Mod: PO

## 2024-03-26 PROCEDURE — 97530 THERAPEUTIC ACTIVITIES: CPT | Mod: PO

## 2024-03-26 NOTE — PLAN OF CARE
[facility]   Physical Therapy Initial Evaluation      Name: Zach Guillen Jr.  Clinic Number: [id number]    Therapy Diagnosis:   Encounter Diagnoses   Name Primary?    Left shoulder pain, unspecified chronicity     Upper extremity weakness Yes        Physician: Jarred Luna MD    Physician Orders: PT Eval and Treat   Medical Diagnosis from Referral: M25.512 (ICD-10-CM) - Left shoulder pain, unspecified chronicity   Evaluation Date: 3/26/2024  Authorization Period Expiration: 2/18/2025  Plan of Care Expiration: 5/7/2024  Progress Note Due: 4/26/2024  Date of Surgery: 1/30/2024 L RTC arthroscopic repair with balloon spacer  Visit # / Visits authorized: 1/ 1   FOTO: 1/ 3    Precautions: Standard     Time In: 0900  Time Out: 0950  Total Billable Time: 50 minutes    Subjective     Date of onset: 1/30/2024    History of current condition - Juanjo reports: injuring his L shoulder in May 2023 when working out at the gym. He reports receiving a L arthroscopic RTC repair on 1/30/2024 with a balloon spacer placed. He denies complications following the procedure, but states he continues to experience mild discomfort when lifting objects overhead such as a gallon of mil. He states he is still active in the gym without complications.    Falls: None    Imaging: See EPIC:     Prior Therapy: None following surgery  Social History:  Lives with wife  Occupation: Retired  Prior Level of Function: ind  Current Level of Function: ind    Pain:  Current 0/10, worst 7/10, best 0/10   Location: left shoulder   Description: NA  Aggravating Factors: prolonged overhead reach  Easing Factors: rest    Patients goals: improve overall function     Medical History:   No past medical history on file.    Surgical History:   Zach Guillen Jr.  has a past surgical history that includes Rotator cuff repair; sinus surgery; Knee arthroscopy (Left); and Shoulder arthroscopy (Left, 1/30/2024).    Medications:   Zach has a  current medication list which includes the following prescription(s): b complex vitamins, ondansetron, and oxycodone-acetaminophen.    Allergies:   Review of patient's allergies indicates:  No Known Allergies     Objective            Posture: FHP and increased thoracic kyphosis    Range of Motion:   Shoulder Left Right   Flexion 165 165   Abduction 165 180   ER reach T2 T3   IR reach T10 T9       Upper Extremity Strength   (L) UE (R) UE   Shoulder flexion: 4+/5 4+/5   Shoulder Abduction: 4+/5 4+/5   Shoulder ER 4/5 4+/5   Shoulder IR 5/5 5/5   Lower Trap 3+/5 3+/5   Middle Trap 3+/5 3+/5   Rhomboids 3+/5 3+/5         Joint Mobility:hypomobile    Palpation: Mild TTP L infraspinatus muscle belly            Intake Outcome Measure for FOTO shoulder Survey    Therapist reviewed FOTO scores for Zach Guillen Jr. on 3/26/2024.   FOTO report - see Media section or FOTO account episode details.    Intake Score: 31% limit         Treatment     Total Treatment time (time-based codes) separate from Evaluation: 15 minutes     Juanjo received the treatments listed below:        therapeutic activities to improve functional performance for 15  minutes, including:  Education and demo of HEP to include:   RTB isometric walk outs x10  D2 flexion c RTB x10 BUE  No moneys no band x20  Middle row machine 60# 3x10         Patient Education and Home Exercises     Education provided:   - On HEP and POC    Written Home Exercises Provided: yes. Exercises were reviewed and Juanjo was able to demonstrate them prior to the end of the session.  Juanjo demonstrated good  understanding of the education provided. See EMR under Patient Instructions for exercises provided during therapy sessions.    Assessment     Zach is a 68 y.o. male referred to outpatient Physical Therapy with a medical diagnosis of M25.512 (ICD-10-CM) - Left shoulder pain, unspecified chronicity. Patient presents with increased pain and decreased range of motion, strength, and  flexibility. These deficits limit the patient from performing everyday activities to include lifting, carrying, bending, reaching, pushing, and pulling without pain or limitations. Pt appears to be progressing well s/p L arthroscopic RTC repair, but is most limited in ER strength and endurance with overhead reach. Pt was administered a ER isometric and periscapular strengthening HEP and demonstrated good performance to all exercises. Due to these deficits, pt will benefit from skilled PT services to improve mobility, control pain, and restore overall function.         Patient prognosis is Good.   Patient will benefit from skilled outpatient Physical Therapy to address the deficits stated above and in the chart below, provide patient /family education, and to maximize patientt's level of independence.     Plan of care discussed with patient: Yes  Patient's spiritual, cultural and educational needs considered and patient is agreeable to the plan of care and goals as stated below:     Anticipated Barriers for therapy: none    Medical Necessity is demonstrated by the following  History  Co-morbidities and personal factors that may impact the plan of care [x] LOW: no personal factors / co-morbidities  [] MODERATE: 1-2 personal factors / co-morbidities  [] HIGH: 3+ personal factors / co-morbidities    Moderate / High Support Documentation:   Co-morbidities affecting plan of care: pmhx    Personal Factors:   no deficits     Examination  Body Structures and Functions, activity limitations and participation restrictions that may impact the plan of care [x] LOW: addressing 1-2 elements  [] MODERATE: 3+ elements  [] HIGH: 4+ elements (please support below)    Moderate / High Support Documentation:      Clinical Presentation [x] LOW: stable  [] MODERATE: Evolving  [] HIGH: Unstable     Decision Making/ Complexity Score: low       Goals:  Short Term Goals: 3 weeks   Pt to report worst pain 3/10 with overhead reach  Pt to improve  ER strength by 1/2 grade  Pt to improve FOTO by 10%  Pt to demo independence with initial HEP    Long Term Goals: 6 weeks   Pt to report no pain with prolonged overhead reach  Pt to improve BUE strength to 5/5  Pt to improve FOTO by 20%  Pt to demo independence with final HEP  Plan     Plan of care Certification: 3/26/2024 to 5/7/2024.    Outpatient Physical Therapy 1 times weekly for 6 weeks to include the following interventions: Manual Therapy, Moist Heat/ Ice, Neuromuscular Re-ed, Patient Education, Self Care, Therapeutic Activities, and Therapeutic Exercise.     Gunner Song, PT        Physician's Signature: _________________________________________ Date: ________________

## 2024-04-10 ENCOUNTER — CLINICAL SUPPORT (OUTPATIENT)
Dept: REHABILITATION | Facility: HOSPITAL | Age: 69
End: 2024-04-10
Payer: MEDICARE

## 2024-04-10 DIAGNOSIS — R29.898 UPPER EXTREMITY WEAKNESS: Primary | ICD-10-CM

## 2024-04-10 PROCEDURE — 97110 THERAPEUTIC EXERCISES: CPT | Mod: PO

## 2024-04-10 PROCEDURE — 97112 NEUROMUSCULAR REEDUCATION: CPT | Mod: PO

## 2024-04-10 PROCEDURE — 97140 MANUAL THERAPY 1/> REGIONS: CPT | Mod: PO

## 2024-04-10 NOTE — PROGRESS NOTES
[facility]   Physical Therapy Treatment Note     Name: Zach Guillen Jr.  Clinic Number: [id number]    Therapy Diagnosis:   Encounter Diagnosis   Name Primary?    Upper extremity weakness Yes     Physician: Jarred Luna MD    Visit Date: 4/10/2024    Physician Orders: PT Eval and Treat   Medical Diagnosis from Referral: M25.512 (ICD-10-CM) - Left shoulder pain, unspecified chronicity   Evaluation Date: 3/26/2024  Authorization Period Expiration: 2/18/2025  Plan of Care Expiration: 5/7/2024  Progress Note Due: 4/26/2024  Date of Surgery: 1/30/2024 L RTC arthroscopic repair with balloon spacer  Visit # / Visits authorized: 1/ 1   FOTO: 1/ 3     Precautions: Standard     PTA Visit #: 0/5     Time In: 1255  Time Out: 1305  Total Billable Time: 70 minutes    SUBJECTIVE     Pt reports: no new complaints besides mild soreness in L shoulder.  He was compliant with home exercise program.      Pain: 4/10  Location: left shoulder      OBJECTIVE         Treatment     Juanjo received the treatments listed below:      therapeutic exercises to develop strength, endurance, ROM, flexibility, and posture for 25 minutes including:  UBE 4/4  Supine wand flexion c 3# dowel 3x10  Supine dowel bench press c 5# dowel 3x10  Supine serratus punches c 5# dowel 3x10    manual therapy techniques: Joint mobilizations were applied to the: L shoulder for 15 minutes, including:  L shoulder post glides G3  L shoulder inf glides G3    neuromuscular re-education activities to improve: Coordination, Kinesthetic, Sense, Proprioception, and Posture for 30 minutes. The following activities were included:  RTB isometric walk outs x10  D2 flexion c RTB x10 BUE  No moneys no band x30  Middle row machine 60# 3x10   RTC ER isometrics 3x10 c 3 sec hold        Patient Education and Home Exercises     Home Exercises Provided and Patient Education Provided     Education provided:   - on HEP and POC    Written Home Exercises  Provided: yes. Exercises were reviewed and Juanjo was able to demonstrate them prior to the end of the session.  Juanjo demonstrated good  understanding of the education provided. See EMR under Patient Instructions for exercises provided during therapy sessions    ASSESSMENT     First time follow-up performed today. Continued to incorporate RTC isometrics following manual joint mobilizations with good overall tolerance. Pt appears to be progressing well at this time and will benefit from progressions to tolerance going forward to enhance overall function.    Juanjo Is progressing well towards his goals.   Pt prognosis is Good.     Pt will continue to benefit from skilled outpatient physical therapy to address the deficits listed in the problem list box on initial evaluation, provide pt/family education and to maximize pt's level of independence in the home and community environment.     Pt's spiritual, cultural and educational needs considered and pt agreeable to plan of care and goals.     Anticipated barriers to physical therapy: none    Goals:   Short Term Goals: 3 weeks   Pt to report worst pain 3/10 with overhead reach  Pt to improve ER strength by 1/2 grade  Pt to improve FOTO by 10%  Pt to demo independence with initial HEP     Long Term Goals: 6 weeks   Pt to report no pain with prolonged overhead reach  Pt to improve BUE strength to 5/5  Pt to improve FOTO by 20%  Pt to demo independence with final HEP    PLAN     Plan of care Certification: 3/26/2024 to 5/7/2024.     Outpatient Physical Therapy 1 times weekly for 6 weeks to include the following interventions: Manual Therapy, Moist Heat/ Ice, Neuromuscular Re-ed, Patient Education, Self Care, Therapeutic Activities, and Therapeutic Exercise.        Gunner Song, PT

## 2024-04-12 PROBLEM — E78.5 HYPERLIPIDEMIA: Status: ACTIVE | Noted: 2024-01-08

## 2024-04-12 PROBLEM — D64.9 ANEMIA: Status: ACTIVE | Noted: 2024-01-07

## 2024-04-17 ENCOUNTER — CLINICAL SUPPORT (OUTPATIENT)
Dept: REHABILITATION | Facility: HOSPITAL | Age: 69
End: 2024-04-17
Payer: MEDICARE

## 2024-04-17 DIAGNOSIS — R29.898 UPPER EXTREMITY WEAKNESS: Primary | ICD-10-CM

## 2024-04-17 PROCEDURE — 97140 MANUAL THERAPY 1/> REGIONS: CPT | Mod: PO

## 2024-04-17 PROCEDURE — 97112 NEUROMUSCULAR REEDUCATION: CPT | Mod: PO

## 2024-04-17 PROCEDURE — 97110 THERAPEUTIC EXERCISES: CPT | Mod: PO

## 2024-04-17 NOTE — PROGRESS NOTES
"[facility]   Physical Therapy Treatment Note     Name: Zach Guillen Jr.  Clinic Number: [id number]    Therapy Diagnosis:   Encounter Diagnosis   Name Primary?    Upper extremity weakness Yes     Physician: Jarred Luna MD    Visit Date: 4/17/2024    Physician Orders: PT Eval and Treat   Medical Diagnosis from Referral: M25.512 (ICD-10-CM) - Left shoulder pain, unspecified chronicity   Evaluation Date: 3/26/2024  Authorization Period Expiration: 2/18/2025  Plan of Care Expiration: 5/7/2024  Progress Note Due: 4/26/2024  Date of Surgery: 1/30/2024 L RTC arthroscopic repair with balloon spacer  Visit # / Visits authorized: 2/20   FOTO: 1/ 3     Precautions: Standard     PTA Visit #: 0/5     Time In: 1300  Time Out: 1400  Total Billable Time: 60 minutes    SUBJECTIVE     Pt reports: no new complaints today besides L upper trap tightness  He was compliant with home exercise program.      Pain: 4/10  Location: left shoulder      OBJECTIVE         Treatment     Juanjo received the treatments listed below:      therapeutic exercises to develop strength, endurance, ROM, flexibility, and posture for 15 minutes including:  UBE 5/5  R upper trap stretch 3x30"    manual therapy techniques: Joint mobilizations were applied to the: L shoulder for 15 minutes, including:  L shoulder post glides G3  L shoulder inf glides G3      Discussed the purpose, mechanism, and indications for dry needling with Juanjo . Patient was cleared of all precautions and contraindications and pt signed written consent and gave verbal consent to dry needling Rx today.   Palpation used to determine dry needling sites. Increased tone noted at R upper trap. Pt rec'd dry needling in supine position to R upper trap with .30 mm needles.  Pt tolerated treatment well and was not in any distress at the completion of treatment.       neuromuscular re-education activities to improve: Coordination, Kinesthetic, Sense, Proprioception, " and Posture for 30 minutes. The following activities were included:  YTB isometric walk outs x10  D2 flexion c RTB x10 BUE  No moneys no band x30  Middle row machine 60# 3x10   RTC ER isometrics 3x10 c 3 sec hold  Single arm thoracic rotational [id number] BUE 10#        Patient Education and Home Exercises     Home Exercises Provided and Patient Education Provided     Education provided:   - on HEP and POC    Written Home Exercises Provided: yes. Exercises were reviewed and Juanjo was able to demonstrate them prior to the end of the session.  Juanjo demonstrated good  understanding of the education provided. See EMR under Patient Instructions for exercises provided during therapy sessions    ASSESSMENT     Due to pt reporting tension in L upper trap, incorporated TDN to L upper trap with good overall tolerance and local twitch responses achieved. This was followed by flexibility interventions and RTC strengthening with continued weakness noted in L external rotators. Patient will continue to benefit from loading and progressions to tolerance going forward to improve overall function.    Juanjo Is progressing well towards his goals.   Pt prognosis is Good.     Pt will continue to benefit from skilled outpatient physical therapy to address the deficits listed in the problem list box on initial evaluation, provide pt/family education and to maximize pt's level of independence in the home and community environment.     Pt's spiritual, cultural and educational needs considered and pt agreeable to plan of care and goals.     Anticipated barriers to physical therapy: none    Goals:   Short Term Goals: 3 weeks   Pt to report worst pain 3/10 with overhead reach  Pt to improve ER strength by 1/2 grade  Pt to improve FOTO by 10%  Pt to demo independence with initial HEP     Long Term Goals: 6 weeks   Pt to report no pain with prolonged overhead reach  Pt to improve BUE strength to 5/5  Pt to improve FOTO by 20%  Pt to demo  independence with final HEP    PLAN     Plan of care Certification: 3/26/2024 to 5/7/2024.     Outpatient Physical Therapy 1 times weekly for 6 weeks to include the following interventions: Manual Therapy, Moist Heat/ Ice, Neuromuscular Re-ed, Patient Education, Self Care, Therapeutic Activities, and Therapeutic Exercise.        Gunner Song, PT

## 2024-04-23 ENCOUNTER — CLINICAL SUPPORT (OUTPATIENT)
Dept: REHABILITATION | Facility: HOSPITAL | Age: 69
End: 2024-04-23
Payer: MEDICARE

## 2024-04-23 DIAGNOSIS — R29.898 UPPER EXTREMITY WEAKNESS: Primary | ICD-10-CM

## 2024-04-23 PROCEDURE — 97140 MANUAL THERAPY 1/> REGIONS: CPT | Mod: PO

## 2024-04-23 PROCEDURE — 97112 NEUROMUSCULAR REEDUCATION: CPT | Mod: PO

## 2024-04-23 PROCEDURE — 97110 THERAPEUTIC EXERCISES: CPT | Mod: PO

## 2024-04-23 NOTE — PROGRESS NOTES
"[facility]   Physical Therapy Treatment Note     Name: Zach Guillen Jr.  Clinic Number: [id number]    Therapy Diagnosis:   Encounter Diagnosis   Name Primary?    Upper extremity weakness Yes     Physician: Jarred Luna MD    Visit Date: 4/23/2024    Physician Orders: PT Eval and Treat   Medical Diagnosis from Referral: M25.512 (ICD-10-CM) - Left shoulder pain, unspecified chronicity   Evaluation Date: 3/26/2024  Authorization Period Expiration: 2/18/2025  Plan of Care Expiration: 5/7/2024  Progress Note Due: 4/26/2024  Date of Surgery: 1/30/2024 L RTC arthroscopic repair with balloon spacer  Visit # / Visits authorized: 3/20   FOTO: 1/ 3     Precautions: Standard     PTA Visit #: 0/5     Time In: 1000  Time Out: 1058  Total Billable Time: 58 minutes    SUBJECTIVE     Pt reports: no new complaints today besides L upper trap tightness. He states the needling did not result in increased discomfort last session.  He was compliant with home exercise program.      Pain: 4/10  Location: left shoulder      OBJECTIVE         Treatment     Juanjo received the treatments listed below:      therapeutic exercises to develop strength, endurance, ROM, flexibility, and posture for 15 minutes including:  UBE 5/5  R upper trap stretch 3x30"  Supine dowel flexion c 3# dowel 2x10  Supine serratus punch c 3# dowel 2x10  Supine bench press c 10# dowel 2x10    manual therapy techniques: Joint mobilizations were applied to the: L shoulder for 15 minutes, including:  L shoulder post glides G3  L shoulder inf glides G3      Discussed the purpose, mechanism, and indications for dry needling with Juanjo . Patient was cleared of all precautions and contraindications and pt signed written consent and gave verbal consent to dry needling Rx today.   Palpation used to determine dry needling sites. Increased tone noted at R upper trap. Pt rec'd dry needling in supine position to R upper trap with .30 mm needles.  Pt " tolerated treatment well and was not in any distress at the completion of treatment.       neuromuscular re-education activities to improve: Coordination, Kinesthetic, Sense, Proprioception, and Posture for 28 minutes. The following activities were included:  YTB isometric walk outs x16 (stopped at 16 due to increased fatigue and poor form)  D2 flexion c RTB x10 BUE  No moneys no band x30  Middle row machine 60# 3x10   RTC ER isometrics 3x10 c 3 sec hold  Single arm thoracic rotational [id number] BUE 10#        Patient Education and Home Exercises     Home Exercises Provided and Patient Education Provided     Education provided:   - on HEP and POC    Written Home Exercises Provided: yes. Exercises were reviewed and Juanjo was able to demonstrate them prior to the end of the session.  Juanjo demonstrated good  understanding of the education provided. See EMR under Patient Instructions for exercises provided during therapy sessions    ASSESSMENT     Continued with TDN to L upper trap with several local twitch responses achieved. This was followed by periscapular and RTC strengthening within tolerance level with no adverse reactions. Pt noted to demo increased fatigue with isometric ER strengthening today, resulting in early termination of exercises due to form breakdown. Patient will continue to benefit from loading and progressions to tolerance going forward to improve overall function.    Juanjo Is progressing well towards his goals.   Pt prognosis is Good.     Pt will continue to benefit from skilled outpatient physical therapy to address the deficits listed in the problem list box on initial evaluation, provide pt/family education and to maximize pt's level of independence in the home and community environment.     Pt's spiritual, cultural and educational needs considered and pt agreeable to plan of care and goals.     Anticipated barriers to physical therapy: none    Goals:   Short Term Goals: 3 weeks   Pt to report  worst pain 3/10 with overhead reach  Pt to improve ER strength by 1/2 grade  Pt to improve FOTO by 10%  Pt to demo independence with initial HEP     Long Term Goals: 6 weeks   Pt to report no pain with prolonged overhead reach  Pt to improve BUE strength to 5/5  Pt to improve FOTO by 20%  Pt to demo independence with final HEP    PLAN     Plan of care Certification: 3/26/2024 to 5/7/2024.     Outpatient Physical Therapy 1 times weekly for 6 weeks to include the following interventions: Manual Therapy, Moist Heat/ Ice, Neuromuscular Re-ed, Patient Education, Self Care, Therapeutic Activities, and Therapeutic Exercise.        Gunner Song, PT

## 2024-04-25 PROBLEM — R03.0 ELEVATED BP WITHOUT DIAGNOSIS OF HYPERTENSION: Status: ACTIVE | Noted: 2024-04-25

## 2024-04-25 PROBLEM — Z78.9 ENROLLED IN CHRONIC CARE MANAGEMENT: Status: ACTIVE | Noted: 2024-04-25

## 2024-04-25 PROBLEM — H57.89 EYE REDNESS: Status: ACTIVE | Noted: 2024-04-25

## 2024-04-25 PROBLEM — I25.10 CORONARY ARTERY DISEASE INVOLVING NATIVE CORONARY ARTERY OF NATIVE HEART WITHOUT ANGINA PECTORIS: Status: ACTIVE | Noted: 2024-04-25

## 2024-04-26 ENCOUNTER — CLINICAL SUPPORT (OUTPATIENT)
Dept: REHABILITATION | Facility: HOSPITAL | Age: 69
End: 2024-04-26
Payer: MEDICARE

## 2024-04-26 DIAGNOSIS — R29.898 UPPER EXTREMITY WEAKNESS: Primary | ICD-10-CM

## 2024-04-26 PROCEDURE — 97112 NEUROMUSCULAR REEDUCATION: CPT | Mod: PO

## 2024-04-26 PROCEDURE — 97110 THERAPEUTIC EXERCISES: CPT | Mod: PO

## 2024-04-26 NOTE — PROGRESS NOTES
"[facility]   Physical Therapy Treatment Note     Name: Zach Guillen Jr.  Clinic Number: [id number]    Therapy Diagnosis:   Encounter Diagnosis   Name Primary?    Upper extremity weakness Yes     Physician: Jarred Luna MD    Visit Date: 4/26/2024    Physician Orders: PT Eval and Treat   Medical Diagnosis from Referral: M25.512 (ICD-10-CM) - Left shoulder pain, unspecified chronicity   Evaluation Date: 3/26/2024  Authorization Period Expiration: 2/18/2025  Plan of Care Expiration: 5/7/2024  Progress Note Due: 4/26/2024  Date of Surgery: 1/30/2024 L RTC arthroscopic repair with balloon spacer  Visit # / Visits authorized: 4/20   FOTO: 1/ 3     Precautions: Standard     PTA Visit #: 0/5     Time In: 0800  Time Out: 0853  Total Billable Time: 53 minutes    SUBJECTIVE     Pt reports: no new complaints today besides L upper trap tightness. He states the needling did not result in increased discomfort last session.  He was compliant with home exercise program.      Pain: 4/10  Location: left shoulder      OBJECTIVE         Treatment     Juanjo received the treatments listed below:      therapeutic exercises to develop strength, endurance, ROM, flexibility, and posture for 15 minutes including:  UBE 5/5  R upper trap stretch 3x30"  Supine flexion c 2# 3x10 LUE  Supine serratus punch c 5# dowel 2x10  Supine bench press c 10# dowel 2x10  SL abduction c 2# weight 3x10 LUE    manual therapy techniques: Joint mobilizations were applied to the: L shoulder for 0 minutes, including:  L shoulder post glides G3  L shoulder inf glides G3      Discussed the purpose, mechanism, and indications for dry needling with Juanjo . Patient was cleared of all precautions and contraindications and pt signed written consent and gave verbal consent to dry needling Rx today.   Palpation used to determine dry needling sites. Increased tone noted at R upper trap. Pt rec'd dry needling in supine position to R upper trap " with .30 mm needles.  Pt tolerated treatment well and was not in any distress at the completion of treatment.       neuromuscular re-education activities to improve: Coordination, Kinesthetic, Sense, Proprioception, and Posture for 38 minutes. The following activities were included:  YTB isometric walk outs x16 (stopped at 16 due to increased fatigue and poor form)  D2 flexion c RTB x10 BUE  No moneys no band x30  Middle row machine 60# 3x10   RTC ER isometrics 3x10 c 3 sec hold  Single arm thoracic rotational [id number] BUE 10#  AAROM ER c YTB c PT assist 2x10 LUE        Patient Education and Home Exercises     Home Exercises Provided and Patient Education Provided     Education provided:   - on HEP and POC    Written Home Exercises Provided: yes. Exercises were reviewed and Juanjo was able to demonstrate them prior to the end of the session.  Juanjo demonstrated good  understanding of the education provided. See EMR under Patient Instructions for exercises provided during therapy sessions    ASSESSMENT     Pt continues to progress well at this time but still most limited by ER strength and range of motion deficits. Pt able to tolerate isotonic ER strengthening, but requires tactile support and cueing to be able to effectively externally rotate against very light resistance. Patient will continue to benefit from loading and progressions to tolerance going forward to improve overall function.    Juanjo Is progressing well towards his goals.   Pt prognosis is Good.     Pt will continue to benefit from skilled outpatient physical therapy to address the deficits listed in the problem list box on initial evaluation, provide pt/family education and to maximize pt's level of independence in the home and community environment.     Pt's spiritual, cultural and educational needs considered and pt agreeable to plan of care and goals.     Anticipated barriers to physical therapy: none    Goals:   Short Term Goals: 3 weeks   Pt to  report worst pain 3/10 with overhead reach  Pt to improve ER strength by 1/2 grade  Pt to improve FOTO by 10%  Pt to demo independence with initial HEP     Long Term Goals: 6 weeks   Pt to report no pain with prolonged overhead reach  Pt to improve BUE strength to 5/5  Pt to improve FOTO by 20%  Pt to demo independence with final HEP    PLAN     Plan of care Certification: 3/26/2024 to 5/7/2024.     Outpatient Physical Therapy 1 times weekly for 6 weeks to include the following interventions: Manual Therapy, Moist Heat/ Ice, Neuromuscular Re-ed, Patient Education, Self Care, Therapeutic Activities, and Therapeutic Exercise.        Gunner Song, PT

## 2024-04-30 ENCOUNTER — CLINICAL SUPPORT (OUTPATIENT)
Dept: REHABILITATION | Facility: HOSPITAL | Age: 69
End: 2024-04-30
Payer: MEDICARE

## 2024-04-30 DIAGNOSIS — R29.898 UPPER EXTREMITY WEAKNESS: Primary | ICD-10-CM

## 2024-04-30 PROCEDURE — 97112 NEUROMUSCULAR REEDUCATION: CPT | Mod: PO

## 2024-04-30 NOTE — PROGRESS NOTES
"[facility]   Physical Therapy Treatment Note     Name: Zach Guillen Jr.  Clinic Number: [id number]    Therapy Diagnosis:   Encounter Diagnosis   Name Primary?    Upper extremity weakness Yes     Physician: Jarred Luna MD    Visit Date: 4/30/2024    Physician Orders: PT Eval and Treat   Medical Diagnosis from Referral: M25.512 (ICD-10-CM) - Left shoulder pain, unspecified chronicity   Evaluation Date: 3/26/2024  Authorization Period Expiration: 2/18/2025  Plan of Care Expiration: 5/7/2024  Progress Note Due: 4/26/2024  Date of Surgery: 1/30/2024 L RTC arthroscopic repair with balloon spacer  Visit # / Visits authorized: 5/20   FOTO: 1/ 3     Precautions: Standard     PTA Visit #: 0/5     Time In: 0800  Time Out: 0855  Total Billable Time: 30 minutes 1:1    SUBJECTIVE     Pt reports: no new complaints today and feels he is nearing discharge soon.   He was compliant with home exercise program.      Pain: 4/10  Location: left shoulder      OBJECTIVE         Treatment     Juanjo received the treatments listed below:      therapeutic exercises to develop strength, endurance, ROM, flexibility, and posture for 20 minutes including:  UBE 5/5  R upper trap stretch 3x30"  Supine flexion c 2# 3x10 LUE  Supine serratus punch c 5# dowel 2x10  Supine bench press c 10# dowel 2x10  SL abduction c 2# weight 3x10 left upper extremity  Shelf placement of 3# weight 3x10 LUE    manual therapy techniques: Joint mobilizations were applied to the: L shoulder for 0 minutes, including:  L shoulder post glides G3  L shoulder inf glides G3      Discussed the purpose, mechanism, and indications for dry needling with Juanjo . Patient was cleared of all precautions and contraindications and pt signed written consent and gave verbal consent to dry needling Rx today.   Palpation used to determine dry needling sites. Increased tone noted at R upper trap. Pt rec'd dry needling in supine position to R upper trap with " .30 mm needles.  Pt tolerated treatment well and was not in any distress at the completion of treatment.       neuromuscular re-education activities to improve: Coordination, Kinesthetic, Sense, Proprioception, and Posture for 35 minutes. The following activities were included:  YTB isometric walk outs x16 (stopped at 16 due to increased fatigue and poor form)  D2 flexion c RTB x10 BUE  No moneys no band x30  Middle row machine 60# 3x10   RTC ER isometrics 3x10 c 3 sec hold  Single arm thoracic rotational [id number] BUE 10#  AAROM ER c YTB c PT assist 2x10 LUE        Patient Education and Home Exercises     Home Exercises Provided and Patient Education Provided     Education provided:   - on HEP and POC    Written Home Exercises Provided: yes. Exercises were reviewed and Juanjo was able to demonstrate them prior to the end of the session.  Juanjo demonstrated good  understanding of the education provided. See EMR under Patient Instructions for exercises provided during therapy sessions    ASSESSMENT     Pt with notable improvement with overhead lifting  activities today suggesting success with current interventions in improving functional capacity as sessions continue. Patient will continue to benefit from loading and progressions to tolerance going forward to improve overall function.    Juanjo Is progressing well towards his goals.   Pt prognosis is Good.     Pt will continue to benefit from skilled outpatient physical therapy to address the deficits listed in the problem list box on initial evaluation, provide pt/family education and to maximize pt's level of independence in the home and community environment.     Pt's spiritual, cultural and educational needs considered and pt agreeable to plan of care and goals.     Anticipated barriers to physical therapy: none    Goals:   Short Term Goals: 3 weeks   Pt to report worst pain 3/10 with overhead reach  Pt to improve ER strength by 1/2 grade  Pt to improve FOTO by  10%  Pt to demo independence with initial HEP     Long Term Goals: 6 weeks   Pt to report no pain with prolonged overhead reach  Pt to improve BUE strength to 5/5  Pt to improve FOTO by 20%  Pt to demo independence with final HEP    PLAN     Plan of care Certification: 3/26/2024 to 5/7/2024.     Outpatient Physical Therapy 1 times weekly for 6 weeks to include the following interventions: Manual Therapy, Moist Heat/ Ice, Neuromuscular Re-ed, Patient Education, Self Care, Therapeutic Activities, and Therapeutic Exercise.        Gunner Song, PT

## 2024-05-02 ENCOUNTER — CLINICAL SUPPORT (OUTPATIENT)
Dept: REHABILITATION | Facility: HOSPITAL | Age: 69
End: 2024-05-02
Payer: MEDICARE

## 2024-05-02 DIAGNOSIS — R29.898 UPPER EXTREMITY WEAKNESS: Primary | ICD-10-CM

## 2024-05-02 PROCEDURE — 97110 THERAPEUTIC EXERCISES: CPT | Mod: PO

## 2024-05-02 PROCEDURE — 97112 NEUROMUSCULAR REEDUCATION: CPT | Mod: PO

## 2024-05-02 NOTE — PROGRESS NOTES
"[facility]   Physical Therapy Treatment Note     Name: Zach Guillen Jr.  Clinic Number: [id number]    Therapy Diagnosis:   Encounter Diagnosis   Name Primary?    Upper extremity weakness Yes     Physician: Jarred Luna MD    Visit Date: 5/2/2024    Physician Orders: PT Eval and Treat   Medical Diagnosis from Referral: M25.512 (ICD-10-CM) - Left shoulder pain, unspecified chronicity   Evaluation Date: 3/26/2024  Authorization Period Expiration: 2/18/2025  Plan of Care Expiration: 5/7/2024  Progress Note Due: 4/26/2024  Date of Surgery: 1/30/2024 L RTC arthroscopic repair with balloon spacer  Visit # / Visits authorized: 6/20   FOTO: 1/ 3     Precautions: Standard     PTA Visit #: 0/5     Time In: 0900  Time Out: 0955  Total Billable Time: 55 minutes    SUBJECTIVE     Pt reports: no new complaints today and feels he is nearing discharge soon.   He was compliant with home exercise program.      Pain: 4/10  Location: left shoulder      OBJECTIVE         Treatment     Juanjo received the treatments listed below:      therapeutic exercises to develop strength, endurance, ROM, flexibility, and posture for 17 minutes including:  UBE 5min  R upper trap stretch 3x30"  Supine flexion c 2# 3x10 LUE  Supine serratus punch c 5# dowel 2x10  Supine bench press c 10# dowel 2x10  SL abduction c 2# weight 3x10 left upper extremity  Shelf placement of 3# weight 3x10 LUE    manual therapy techniques: Joint mobilizations were applied to the: L shoulder for 0 minutes, including:  L shoulder post glides G3  L shoulder inf glides G3      Discussed the purpose, mechanism, and indications for dry needling with Juanjo . Patient was cleared of all precautions and contraindications and pt signed written consent and gave verbal consent to dry needling Rx today.   Palpation used to determine dry needling sites. Increased tone noted at R upper trap. Pt rec'd dry needling in supine position to R upper trap with .30 " "mm needles.  Pt tolerated treatment well and was not in any distress at the completion of treatment.       neuromuscular re-education activities to improve: Coordination, Kinesthetic, Sense, Proprioception, and Posture for 38 minutes. The following activities were included:  YTB isometric walk outs x16 (stopped at 16 due to increased fatigue and poor form)  D2 flexion c RTB x10 BUE  No moneys no band x30  Middle row machine 60# 3x10   RTC ER isometrics 3x10 c 3 sec hold  Single arm thoracic rotational [id number] BUE 10#  AAROM ER c YTB c PT assist 2x10 left upper extremity  Body Blade 3x30" fwd punches  Body blade 3x30"        Patient Education and Home Exercises     Home Exercises Provided and Patient Education Provided     Education provided:   - on HEP and POC    Written Home Exercises Provided: yes. Exercises were reviewed and Juanjo was able to demonstrate them prior to the end of the session.  Juanjo demonstrated good  understanding of the education provided. See EMR under Patient Instructions for exercises provided during therapy sessions    ASSESSMENT     Progressed today's interventions to incorporate body blade activities for shoulder stabilization training. Pt with good overall tolerance to today's treatment with good carryover of performance notes. Pt will benefit from re-assessment at next visit with probable discharge home with a HEP is all goals are met. Patient will continue to benefit from loading and progressions to tolerance going forward to improve overall function.    Juanjo Is progressing well towards his goals.   Pt prognosis is Good.     Pt will continue to benefit from skilled outpatient physical therapy to address the deficits listed in the problem list box on initial evaluation, provide pt/family education and to maximize pt's level of independence in the home and community environment.     Pt's spiritual, cultural and educational needs considered and pt agreeable to plan of care and goals.   "   Anticipated barriers to physical therapy: none    Goals:   Short Term Goals: 3 weeks   Pt to report worst pain 3/10 with overhead reach  Pt to improve ER strength by 1/2 grade  Pt to improve FOTO by 10%  Pt to demo independence with initial HEP     Long Term Goals: 6 weeks   Pt to report no pain with prolonged overhead reach  Pt to improve BUE strength to 5/5  Pt to improve FOTO by 20%  Pt to demo independence with final HEP    PLAN     Plan of care Certification: 3/26/2024 to 5/7/2024.     Outpatient Physical Therapy 1 times weekly for 6 weeks to include the following interventions: Manual Therapy, Moist Heat/ Ice, Neuromuscular Re-ed, Patient Education, Self Care, Therapeutic Activities, and Therapeutic Exercise.        Gunner Song, PT

## 2024-05-07 ENCOUNTER — CLINICAL SUPPORT (OUTPATIENT)
Dept: REHABILITATION | Facility: HOSPITAL | Age: 69
End: 2024-05-07
Payer: MEDICARE

## 2024-05-07 DIAGNOSIS — R29.898 UPPER EXTREMITY WEAKNESS: Primary | ICD-10-CM

## 2024-05-07 PROCEDURE — 97112 NEUROMUSCULAR REEDUCATION: CPT | Mod: KX,PO

## 2024-05-07 PROCEDURE — 97530 THERAPEUTIC ACTIVITIES: CPT | Mod: KX,PO

## 2024-05-07 NOTE — PROGRESS NOTES
"[facility]   Physical Therapy Discharge Note     Name: Zach Guillen Jr.  Clinic Number: [id number]    Therapy Diagnosis:   Encounter Diagnosis   Name Primary?    Upper extremity weakness Yes     Physician: Jarred Luna MD    Visit Date: 5/7/2024    Physician Orders: PT Eval and Treat   Medical Diagnosis from Referral: M25.512 (ICD-10-CM) - Left shoulder pain, unspecified chronicity   Evaluation Date: 3/26/2024  Authorization Period Expiration: 2/18/2025  Plan of Care Expiration: 5/7/2024  Progress Note Due: 4/26/2024  Date of Surgery: 1/30/2024 L RTC arthroscopic repair with balloon spacer  Visit # / Visits authorized: 7/20   FOTO: 1/ 3     Precautions: Standard     PTA Visit #: 0/5     Time In: 1500  Time Out: 1553  Total Billable Time: 53 minutes    SUBJECTIVE     Pt reports: no new complaints today and feels he is ready for discharge  He was compliant with home exercise program.      Pain: 4/10  Location: left shoulder      OBJECTIVE          Posture: FHP and increased thoracic kyphosis     Range of Motion:   Shoulder Left Right   Flexion 165 165   Abduction 170 180   ER reach T2 T3   IR reach T10 T9         Upper Extremity Strength    (L) UE (R) UE   Shoulder flexion: 5/5 5/5   Shoulder Abduction: 5/5 5/5   Shoulder ER 4+/5 5/5   Shoulder IR 5/5 5/5   Lower Trap 4+/5 4+/5   Middle Trap 4+/5 4+/5   Rhomboids 4+/5 4+/5           Treatment     Juanjo received the treatments listed below:      therapeutic exercises to develop strength, endurance, ROM, flexibility, and posture for 0 minutes including:  UBE 5min  R upper trap stretch 3x30"  Supine flexion c 2# 3x10 LUE  Supine serratus punch c 5# dowel 2x10  Supine bench press c 10# dowel 2x10  SL abduction c 2# weight 3x10 left upper extremity  Shelf placement of 3# weight 3x10 LUE    manual therapy techniques: Joint mobilizations were applied to the: L shoulder for 0 minutes, including:  L shoulder post glides G3  L shoulder inf " "loren G3      Discussed the purpose, mechanism, and indications for dry needling with Juanjo . Patient was cleared of all precautions and contraindications and pt signed written consent and gave verbal consent to dry needling Rx today.   Palpation used to determine dry needling sites. Increased tone noted at R upper trap. Pt rec'd dry needling in supine position to R upper trap with .30 mm needles.  Pt tolerated treatment well and was not in any distress at the completion of treatment.       neuromuscular re-education activities to improve: Coordination, Kinesthetic, Sense, Proprioception, and Posture for 38 minutes. The following activities were included:  YTB isometric walk outs x16 (stopped at 16 due to increased fatigue and poor form)  D2 flexion c RTB x10 BUE  No moneys no band x30  Middle row machine 60# 3x10   RTC ER isometrics 3x10 c 3 sec hold  Single arm thoracic rotational [id number] BUE 10#  AAROM ER c YTB c PT assist 2x10 left upper extremity  Body Blade 3x30" fwd punches  Body blade 3x30"    Therapeutic activity for 15 mins to include:  Comprehensive education and walkthrough of HEP and D/C planning    Patient Education and Home Exercises     Home Exercises Provided and Patient Education Provided     Education provided:   - on HEP and POC    Written Home Exercises Provided: yes. Exercises were reviewed and Juanjo was able to demonstrate them prior to the end of the session.  Juanjo demonstrated good  understanding of the education provided. See EMR under Patient Instructions for exercises provided during therapy sessions    ASSESSMENT     Re-assessment performed today. Pt noted to have met neatly all functional goals and is at a point where he is independent with all HEP exercises and demonstrated good ability to maintain progress on his own at home. Due to this, pt was educated on a final HEP and demonstrated good performance of all instructed exercises. Due to pt with return to PLOF, pt will benefit from " trial of HEP and discharge from supervised therapy to continue to maintain progress for long term improvement in overall function.    Juanjo Is progressing well towards his goals.   Pt prognosis is Good.     Patient will discharge at this time    Pt's spiritual, cultural and educational needs considered and pt agreeable to plan of care and goals.     Anticipated barriers to physical therapy: none    Goals:   Short Term Goals: 3 weeks   Pt to report worst pain 3/10 with overhead reach-YOHANNES  Pt to improve ER strength by 1/2 grade-MET  Pt to improve FOTO by 10%-MET  Pt to demo independence with initial HEP-MET     Long Term Goals: 6 weeks   Pt to report no pain with prolonged overhead reach-MET  Pt to improve BUE strength to 5/5-NEARLY MET  Pt to improve FOTO by 20%-MET  Pt to demo independence with final HEP-MET    PLAN     Patient will discharge at this time    Gunner Song, PT

## 2025-01-08 PROBLEM — Z00.01 ABNORMAL WELLNESS EXAM: Status: ACTIVE | Noted: 2025-01-08

## 2025-01-08 PROBLEM — H57.89 EYE REDNESS: Status: RESOLVED | Noted: 2024-04-25 | Resolved: 2025-01-08

## 2025-01-27 PROBLEM — R20.0 LOWER EXTREMITY NUMBNESS: Status: ACTIVE | Noted: 2025-01-27

## 2025-02-17 PROBLEM — M54.16 LUMBAR RADICULOPATHY: Status: ACTIVE | Noted: 2025-02-17

## 2025-07-21 ENCOUNTER — TELEPHONE (OUTPATIENT)
Dept: ORTHOPEDICS | Facility: CLINIC | Age: 70
End: 2025-07-21
Payer: MEDICARE

## 2025-07-21 NOTE — TELEPHONE ENCOUNTER
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

## 2025-07-23 DIAGNOSIS — M25.561 RIGHT KNEE PAIN, UNSPECIFIED CHRONICITY: Primary | ICD-10-CM

## 2025-07-28 ENCOUNTER — OFFICE VISIT (OUTPATIENT)
Dept: ORTHOPEDICS | Facility: CLINIC | Age: 70
End: 2025-07-28
Payer: MEDICARE

## 2025-07-28 ENCOUNTER — HOSPITAL ENCOUNTER (OUTPATIENT)
Dept: RADIOLOGY | Facility: HOSPITAL | Age: 70
Discharge: HOME | End: 2025-07-28
Attending: ORTHOPAEDIC SURGERY
Payer: MEDICARE

## 2025-07-28 VITALS — HEIGHT: 69 IN | BODY MASS INDEX: 27.13 KG/M2 | WEIGHT: 183.19 LBS

## 2025-07-28 DIAGNOSIS — M17.11 OSTEOARTHRITIS OF RIGHT KNEE, UNSPECIFIED OSTEOARTHRITIS TYPE: Primary | ICD-10-CM

## 2025-07-28 DIAGNOSIS — M25.561 RIGHT KNEE PAIN, UNSPECIFIED CHRONICITY: ICD-10-CM

## 2025-07-28 PROCEDURE — 99999PBSHW PR PBB SHADOW TECHNICAL ONLY FILED TO HB: Mod: PBBFAC,,,

## 2025-07-28 PROCEDURE — 73560 X-RAY EXAM OF KNEE 1 OR 2: CPT | Mod: TC,PO,LT

## 2025-07-28 PROCEDURE — 99214 OFFICE O/P EST MOD 30 MIN: CPT | Mod: 25,S$PBB,, | Performed by: ORTHOPAEDIC SURGERY

## 2025-07-28 PROCEDURE — 99212 OFFICE O/P EST SF 10 MIN: CPT | Mod: PBBFAC,25,PO | Performed by: ORTHOPAEDIC SURGERY

## 2025-07-28 PROCEDURE — 73560 X-RAY EXAM OF KNEE 1 OR 2: CPT | Mod: 26,LT,, | Performed by: RADIOLOGY

## 2025-07-28 PROCEDURE — 73562 X-RAY EXAM OF KNEE 3: CPT | Mod: 26,RT,, | Performed by: RADIOLOGY

## 2025-07-28 PROCEDURE — 20610 DRAIN/INJ JOINT/BURSA W/O US: CPT | Mod: PBBFAC,PO,RT | Performed by: ORTHOPAEDIC SURGERY

## 2025-07-28 PROCEDURE — 99999 PR PBB SHADOW E&M-EST. PATIENT-LVL II: CPT | Mod: PBBFAC,,, | Performed by: ORTHOPAEDIC SURGERY

## 2025-07-28 RX ORDER — TRIAMCINOLONE ACETONIDE 40 MG/ML
40 INJECTION, SUSPENSION INTRA-ARTICULAR; INTRAMUSCULAR
Status: DISCONTINUED | OUTPATIENT
Start: 2025-07-28 | End: 2025-07-28 | Stop reason: HOSPADM

## 2025-07-28 RX ADMIN — TRIAMCINOLONE ACETONIDE 40 MG: 40 INJECTION, SUSPENSION INTRA-ARTICULAR; INTRAMUSCULAR at 09:07

## 2025-07-28 NOTE — PROCEDURES
Large Joint Aspiration/Injection: R knee    Date/Time: 7/28/2025 9:30 AM    Performed by: Jarred Luna MD  Authorized by: Jarred Luna MD    Timeout: prior to procedure the correct patient, procedure, and site was verified    Prep: patient was prepped and draped in usual sterile fashion      Details:  Needle Size:  21 G  Approach:  Anterolateral  Location:  Knee  Site:  R knee  Medications:  40 mg triamcinolone acetonide 40 mg/mL  Patient tolerance:  Patient tolerated the procedure well with no immediate complications

## 2025-07-28 NOTE — PROGRESS NOTES
69 years old right knee pain for about a month no one time traumatic events had some swelling off and on his knee.  Pain is 2 on good days, 6 on bad days.  Had similar symptoms with a responded favorably to injection in the left knee in the past requesting that done today into his right knee     Exam shows no signs infection, does have tenderness the joint line, I can not detect an effusion on today's exam     X-rays show small joint effusion, relatively well-preserved joint spacing     Assessment: Right knee pain knee arthrosis     Plan:  Kenalog injection right knee, encourage long-term strengthening, follow up as needed

## (undated) DEVICE — PAD ABD 8X10 STERILE

## (undated) DEVICE — SEE MEDLINE ITEM 157161

## (undated) DEVICE — DRESSING XEROFORM FOIL PK 1X8

## (undated) DEVICE — GAUZE SPONGE 4X4 12PLY

## (undated) DEVICE — Device

## (undated) DEVICE — SEE MEDLINE ITEM 154981

## (undated) DEVICE — DRAPE STERI-DRAPE 1000 17X11IN

## (undated) DEVICE — STAPLER SKIN ROTATING HEAD

## (undated) DEVICE — MAT EVAC SURGISAFE 36 X 48

## (undated) DEVICE — SOL IRR NACL .9% 3000ML

## (undated) DEVICE — NEPTUNE 4 PORT MANIFOLD

## (undated) DEVICE — BNDG COFLEX FOAM LF2 ST 6X5YD

## (undated) DEVICE — STRAP OR TABLE 5IN X 72IN

## (undated) DEVICE — UNDERGLOVES BIOGEL PI SIZE 8

## (undated) DEVICE — PAD K-THERMIA 24IN X 60IN

## (undated) DEVICE — DRAPE STERI U-SHAPED 47X51IN

## (undated) DEVICE — SUT ETHILON 3-0 FS-1 30

## (undated) DEVICE — SYR 30CC LUER LOCK

## (undated) DEVICE — GLOVE SENSICARE PI GRN 8

## (undated) DEVICE — DRAPE THREE-QTR REINF 53X77IN

## (undated) DEVICE — NDL SPINAL 18GX3.5 SPINOCAN

## (undated) DEVICE — CANNULA SHOULDER 10/BOX

## (undated) DEVICE — BLADE GATOR 4.2

## (undated) DEVICE — TOWEL OR DISP STRL BLUE 4/PK

## (undated) DEVICE — SOCKINETTE IMPERVIOUS 12X48IN

## (undated) DEVICE — GLOVE BIOGEL ORTHOPEDIC 7.5

## (undated) DEVICE — BLADE SURG CARBON STEEL SZ11

## (undated) DEVICE — TUBE SET INFLOW/OUTFLOW